# Patient Record
Sex: FEMALE | Race: WHITE | Employment: OTHER | URBAN - METROPOLITAN AREA
[De-identification: names, ages, dates, MRNs, and addresses within clinical notes are randomized per-mention and may not be internally consistent; named-entity substitution may affect disease eponyms.]

---

## 2017-03-13 ENCOUNTER — HOSPITAL ENCOUNTER (OUTPATIENT)
Age: 67
Discharge: HOME | End: 2017-03-13
Attending: PSYCHIATRY & NEUROLOGY
Payer: MEDICARE

## 2017-03-13 ENCOUNTER — HOSPITAL ENCOUNTER (EMERGENCY)
Age: 67
Discharge: STILL ADMITTED OR ADMINISTRATIVE | End: 2017-03-13
Attending: EMERGENCY MEDICINE
Payer: COMMERCIAL

## 2017-03-13 DIAGNOSIS — G35 MULTIPLE SCLEROSIS (HCC): ICD-10-CM

## 2017-03-13 LAB — CREAT UR-MCNC: 0.8 MG/DL (ref 0.6–1.3)

## 2017-03-13 PROCEDURE — 82565 ASSAY OF CREATININE: CPT

## 2017-03-13 PROCEDURE — 75810000275 HC EMERGENCY DEPT VISIT NO LEVEL OF CARE

## 2017-03-13 PROCEDURE — 74011250636 HC RX REV CODE- 250/636

## 2017-03-13 PROCEDURE — 70553 MRI BRAIN STEM W/O & W/DYE: CPT

## 2017-03-13 PROCEDURE — A9585 GADOBUTROL INJECTION: HCPCS

## 2017-03-13 RX ADMIN — GADOBUTROL 10 ML: 604.72 INJECTION INTRAVENOUS at 15:00

## 2017-08-02 ENCOUNTER — HOSPITAL ENCOUNTER (OUTPATIENT)
Dept: MAMMOGRAPHY | Age: 67
Discharge: HOME | End: 2017-08-02
Attending: NURSE PRACTITIONER
Payer: MEDICARE

## 2017-08-02 DIAGNOSIS — Z12.31 VISIT FOR SCREENING MAMMOGRAM: ICD-10-CM

## 2017-08-02 PROCEDURE — 77067 SCR MAMMO BI INCL CAD: CPT

## 2018-08-03 ENCOUNTER — HOSPITAL ENCOUNTER (OUTPATIENT)
Dept: MAMMOGRAPHY | Age: 68
Discharge: HOME | End: 2018-08-03
Attending: NURSE PRACTITIONER
Payer: MEDICARE

## 2018-08-03 DIAGNOSIS — Z12.31 VISIT FOR SCREENING MAMMOGRAM: ICD-10-CM

## 2018-08-03 PROCEDURE — 77063 BREAST TOMOSYNTHESIS BI: CPT

## 2019-08-06 ENCOUNTER — HOSPITAL ENCOUNTER (OUTPATIENT)
Dept: MAMMOGRAPHY | Age: 69
Discharge: HOME | End: 2019-08-06
Attending: FAMILY MEDICINE
Payer: MEDICARE

## 2019-08-06 DIAGNOSIS — Z12.31 VISIT FOR SCREENING MAMMOGRAM: ICD-10-CM

## 2019-08-06 PROCEDURE — 77063 BREAST TOMOSYNTHESIS BI: CPT

## 2020-09-29 ENCOUNTER — HOSPITAL ENCOUNTER (OUTPATIENT)
Dept: MAMMOGRAPHY | Age: 70
Discharge: HOME | End: 2020-09-29
Attending: INTERNAL MEDICINE
Payer: MEDICARE

## 2020-09-29 DIAGNOSIS — Z12.31 VISIT FOR SCREENING MAMMOGRAM: ICD-10-CM

## 2020-09-29 PROCEDURE — 77067 SCR MAMMO BI INCL CAD: CPT

## 2020-10-13 ENCOUNTER — HOSPITAL ENCOUNTER (OUTPATIENT)
Dept: NON INVASIVE DIAGNOSTICS | Age: 70
Discharge: HOME | End: 2020-10-13
Attending: INTERNAL MEDICINE
Payer: MEDICARE

## 2020-10-13 ENCOUNTER — TRANSCRIBE ORDER (OUTPATIENT)
Dept: SCHEDULING | Age: 70
End: 2020-10-13

## 2020-10-13 VITALS
HEIGHT: 60 IN | BODY MASS INDEX: 44.76 KG/M2 | WEIGHT: 228 LBS | SYSTOLIC BLOOD PRESSURE: 146 MMHG | DIASTOLIC BLOOD PRESSURE: 76 MMHG

## 2020-10-13 DIAGNOSIS — R06.09 OTHER FORM OF DYSPNEA: Primary | ICD-10-CM

## 2020-10-13 DIAGNOSIS — R06.09 OTHER FORM OF DYSPNEA: ICD-10-CM

## 2020-10-13 LAB
ECHO AO ROOT DIAM: 3.03 CM
ECHO AR MAX VEL PISA: 368.09 CM/S
ECHO AV PEAK GRADIENT: 54.2 MMHG
ECHO AV REGURGITANT PHT: 0.39 S
ECHO LA AREA 4C: 20.34 CM2
ECHO LA VOL 2C: 58.31 ML (ref 22–52)
ECHO LA VOL 4C: 56.94 ML (ref 22–52)
ECHO LA VOL BP: 62.24 ML (ref 22–52)
ECHO LA VOL/BSA BIPLANE: 31.54 ML/M2 (ref 16–28)
ECHO LA VOLUME INDEX A2C: 29.55 ML/M2 (ref 16–28)
ECHO LA VOLUME INDEX A4C: 28.85 ML/M2 (ref 16–28)
ECHO LV INTERNAL DIMENSION DIASTOLIC: 4.23 CM (ref 3.9–5.3)
ECHO LV INTERNAL DIMENSION SYSTOLIC: 2.83 CM
ECHO LV IVSD: 1.08 CM (ref 0.6–0.9)
ECHO LV MASS 2D: 132.1 G (ref 67–162)
ECHO LV MASS INDEX 2D: 66.9 G/M2 (ref 43–95)
ECHO LV POSTERIOR WALL DIASTOLIC: 0.85 CM (ref 0.6–0.9)
ECHO LVOT DIAM: 1.94 CM
ECHO LVOT PEAK GRADIENT: 6.54 MMHG
ECHO LVOT PEAK VELOCITY: 127.89 CM/S
ECHO LVOT SV: 88.1 ML
ECHO LVOT VTI: 29.81 CM
ECHO MV A VELOCITY: 118.97 CM/S
ECHO MV E DECELERATION TIME (DT): 0.24 S
ECHO MV E VELOCITY: 116.71 CM/S
ECHO MV E/A RATIO: 0.98
LVOT MG: 2.96 MMHG

## 2020-10-13 PROCEDURE — 93306 TTE W/DOPPLER COMPLETE: CPT

## 2020-10-19 ENCOUNTER — TRANSCRIBE ORDER (OUTPATIENT)
Dept: SCHEDULING | Age: 70
End: 2020-10-19

## 2020-10-19 DIAGNOSIS — I20.9 ANGINA PECTORIS (HCC): Primary | ICD-10-CM

## 2020-10-26 ENCOUNTER — HOSPITAL ENCOUNTER (OUTPATIENT)
Dept: NON INVASIVE DIAGNOSTICS | Age: 70
Discharge: HOME | End: 2020-10-26
Attending: INTERNAL MEDICINE
Payer: MEDICARE

## 2020-10-26 VITALS
BODY MASS INDEX: 44.76 KG/M2 | WEIGHT: 228 LBS | DIASTOLIC BLOOD PRESSURE: 77 MMHG | SYSTOLIC BLOOD PRESSURE: 161 MMHG | HEIGHT: 60 IN

## 2020-10-26 DIAGNOSIS — I20.9 ANGINA PECTORIS (HCC): ICD-10-CM

## 2020-10-26 LAB
STRESS BASELINE DIAS BP: 77 MMHG
STRESS BASELINE HR: 57 BPM
STRESS BASELINE SYS BP: 161 MMHG
STRESS ESTIMATED WORKLOAD: 1 METS
STRESS EXERCISE DUR MIN: NORMAL
STRESS PEAK DIAS BP: 77 MMHG
STRESS PEAK SYS BP: 161 MMHG
STRESS PERCENT HR ACHIEVED: 52 %
STRESS POST PEAK HR: 77 BPM
STRESS RATE PRESSURE PRODUCT: NORMAL BPM*MMHG
STRESS TARGET HR: 148 BPM

## 2020-10-26 PROCEDURE — 93017 CV STRESS TEST TRACING ONLY: CPT

## 2020-10-26 PROCEDURE — 74011250636 HC RX REV CODE- 250/636

## 2020-10-26 RX ORDER — SODIUM CHLORIDE 9 MG/ML
250 INJECTION, SOLUTION INTRAVENOUS ONCE
Status: COMPLETED | OUTPATIENT
Start: 2020-10-26 | End: 2020-10-26

## 2020-10-26 RX ADMIN — SODIUM CHLORIDE 250 ML: 900 INJECTION, SOLUTION INTRAVENOUS at 10:55

## 2020-10-26 RX ADMIN — REGADENOSON 0.4 MG: 0.08 INJECTION, SOLUTION INTRAVENOUS at 10:55

## 2020-10-26 NOTE — PROGRESS NOTES
Patient was given 10.4 milliCuries of 99mTc-Sestamibi for the resting images. Patient was also given 33.0 milliCuries of 99mTc-Sestamibi for the stress images. Injected with 0.4mg Lexiscan. Patient's armband was discarded and shredded.

## 2020-11-16 ENCOUNTER — TRANSCRIBE ORDER (OUTPATIENT)
Dept: SCHEDULING | Age: 70
End: 2020-11-16

## 2020-11-16 DIAGNOSIS — R06.02 SHORTNESS OF BREATH: Primary | ICD-10-CM

## 2020-11-23 ENCOUNTER — HOSPITAL ENCOUNTER (OUTPATIENT)
Dept: CT IMAGING | Age: 70
Discharge: HOME | End: 2020-11-23
Attending: INTERNAL MEDICINE
Payer: MEDICARE

## 2020-11-23 DIAGNOSIS — R06.02 SHORTNESS OF BREATH: ICD-10-CM

## 2020-11-23 LAB — CREAT UR-MCNC: 0.8 MG/DL (ref 0.6–1.3)

## 2020-11-23 PROCEDURE — 71260 CT THORAX DX C+: CPT

## 2020-11-23 PROCEDURE — 74011000636 HC RX REV CODE- 636

## 2020-11-23 PROCEDURE — 82565 ASSAY OF CREATININE: CPT

## 2020-11-23 RX ADMIN — IOPAMIDOL 100 ML: 612 INJECTION, SOLUTION INTRAVENOUS at 14:02

## 2021-02-10 ENCOUNTER — TRANSCRIBE ORDER (OUTPATIENT)
Dept: SCHEDULING | Age: 71
End: 2021-02-10

## 2021-02-10 DIAGNOSIS — K76.89 OTHER SPECIFIED DISEASES OF LIVER: Primary | ICD-10-CM

## 2021-03-08 ENCOUNTER — HOSPITAL ENCOUNTER (OUTPATIENT)
Dept: ULTRASOUND IMAGING | Age: 71
Discharge: HOME | End: 2021-03-08
Attending: INTERNAL MEDICINE
Payer: MEDICARE

## 2021-03-08 DIAGNOSIS — K76.89 OTHER SPECIFIED DISEASES OF LIVER: ICD-10-CM

## 2021-03-08 PROCEDURE — 76705 ECHO EXAM OF ABDOMEN: CPT

## 2021-05-17 ENCOUNTER — TRANSCRIBE ORDER (OUTPATIENT)
Dept: SCHEDULING | Age: 71
End: 2021-05-17

## 2021-05-17 DIAGNOSIS — G35 MULTIPLE SCLEROSIS (HCC): Primary | ICD-10-CM

## 2021-06-04 ENCOUNTER — HOSPITAL ENCOUNTER (OUTPATIENT)
Age: 71
Discharge: HOME | End: 2021-06-04
Attending: PSYCHIATRY & NEUROLOGY
Payer: MEDICARE

## 2021-06-04 DIAGNOSIS — G35 MULTIPLE SCLEROSIS (HCC): ICD-10-CM

## 2021-06-04 PROCEDURE — 82565 ASSAY OF CREATININE: CPT

## 2021-06-04 PROCEDURE — A9575 INJ GADOTERATE MEGLUMI 0.1ML: HCPCS

## 2021-06-04 PROCEDURE — 70553 MRI BRAIN STEM W/O & W/DYE: CPT

## 2021-06-04 PROCEDURE — 74011636320 HC RX REV CODE- 636/320

## 2021-06-04 RX ADMIN — GADOTERATE MEGLUMINE 20 ML: 376.9 INJECTION INTRAVENOUS at 17:07

## 2021-06-07 LAB — CREAT UR-MCNC: 1 MG/DL (ref 0.6–1.3)

## 2021-09-21 ENCOUNTER — TRANSCRIBE ORDER (OUTPATIENT)
Dept: SCHEDULING | Age: 71
End: 2021-09-21

## 2021-09-21 DIAGNOSIS — Z12.31 VISIT FOR SCREENING MAMMOGRAM: Primary | ICD-10-CM

## 2021-10-01 ENCOUNTER — HOSPITAL ENCOUNTER (OUTPATIENT)
Dept: MAMMOGRAPHY | Age: 71
Discharge: HOME | End: 2021-10-01
Attending: INTERNAL MEDICINE
Payer: MEDICARE

## 2021-10-01 DIAGNOSIS — Z12.31 VISIT FOR SCREENING MAMMOGRAM: ICD-10-CM

## 2021-10-01 PROCEDURE — 77063 BREAST TOMOSYNTHESIS BI: CPT

## 2022-06-27 ENCOUNTER — OFFICE VISIT (OUTPATIENT)
Dept: ORTHOPEDIC SURGERY | Age: 72
End: 2022-06-27
Payer: MEDICARE

## 2022-06-27 VITALS
OXYGEN SATURATION: 93 % | BODY MASS INDEX: 39.54 KG/M2 | HEART RATE: 89 BPM | HEIGHT: 60 IN | WEIGHT: 201.4 LBS | TEMPERATURE: 97.3 F

## 2022-06-27 DIAGNOSIS — G89.29 CHRONIC PAIN OF BOTH KNEES: Primary | ICD-10-CM

## 2022-06-27 DIAGNOSIS — M54.50 LUMBAR PAIN: ICD-10-CM

## 2022-06-27 DIAGNOSIS — M25.561 CHRONIC PAIN OF BOTH KNEES: Primary | ICD-10-CM

## 2022-06-27 DIAGNOSIS — M17.0 BILATERAL PRIMARY OSTEOARTHRITIS OF KNEE: ICD-10-CM

## 2022-06-27 DIAGNOSIS — M25.562 CHRONIC PAIN OF BOTH KNEES: Primary | ICD-10-CM

## 2022-06-27 PROCEDURE — 1123F ACP DISCUSS/DSCN MKR DOCD: CPT | Performed by: ORTHOPAEDIC SURGERY

## 2022-06-27 PROCEDURE — 73564 X-RAY EXAM KNEE 4 OR MORE: CPT | Performed by: ORTHOPAEDIC SURGERY

## 2022-06-27 PROCEDURE — 20610 DRAIN/INJ JOINT/BURSA W/O US: CPT | Performed by: ORTHOPAEDIC SURGERY

## 2022-06-27 PROCEDURE — 99204 OFFICE O/P NEW MOD 45 MIN: CPT | Performed by: ORTHOPAEDIC SURGERY

## 2022-06-27 RX ORDER — BETAMETHASONE SODIUM PHOSPHATE AND BETAMETHASONE ACETATE 3; 3 MG/ML; MG/ML
12 INJECTION, SUSPENSION INTRA-ARTICULAR; INTRALESIONAL; INTRAMUSCULAR; SOFT TISSUE ONCE
Status: COMPLETED | OUTPATIENT
Start: 2022-06-27 | End: 2022-06-27

## 2022-06-27 RX ADMIN — BETAMETHASONE SODIUM PHOSPHATE AND BETAMETHASONE ACETATE 12 MG: 3; 3 INJECTION, SUSPENSION INTRA-ARTICULAR; INTRALESIONAL; INTRAMUSCULAR; SOFT TISSUE at 11:09

## 2022-06-27 NOTE — PROGRESS NOTES
Patient: Lexus Cuevas                MRN: [id number]       SSN: [id number]  YOB: 1950        AGE: 66 y.o. SEX: female  Body mass index is 39.33 kg/m². PCP: Francois Reyna MD  06/27/22    Mrs. Azra Dominguez is a with bilateral knee pain previously a patient of Dr. Epi Garza left a little bit worse in the right and will wake her up from sleep at night worse with activities of daily living its been years since she is had any treatment at the to get a little bit of swelling knee examination today she is a very pleasant lady she likes has a good sense of humor hips rotate nicely left knee is in varus Wrightson valgus couple degree fixed flexion deformity bends fairly well to 110 degrees no cyanosis or clubbing minimal peripheral edema both feet warm and well-perfused Homans' sign is negative and there is mild evidence of neuropathy distally calf nontender mild joint line tenderness in all 3 compartments and just a slight effusion    X-rays performed today 6/27/2022 4 views both knees confirm relatively severe arthritis involving both knees left sides worse than the right there was a discussion regarding surgery was decided that is not currently recommended that we should pursue nonoperative measures therefore both knees injected she is going to is having some acupuncture for her back and would like to get another opinion about her back we will have her seen at the spine center been a pleasure to share in her care return to see us 3 months time sooner if there is any problem    REVIEW OF SYSTEMS:      CON: negative  EYE: negative   ENT: negative  RESP: negative  GI:    negative   :  negative  MSK: Positive  A twelve point review of systems was completed, positives noted and all other systems were reviewed and are negative          Past Medical History:   Diagnosis Date    Bilateral swelling of feet     GERD (gastroesophageal reflux disease)     H/O seasonal allergies     Hypercholesteremia     Ill-defined condition     Multiple sclerosis    Menopause     Age 42?  Migraine     w/ nausea    MS (multiple sclerosis) (HCC)     Shortness of breath     UNK etiology except obesity    Unspecified sleep apnea     uses C-Pap       Family History   Problem Relation Age of Onset    Ovarian Cancer Mother        Current Outpatient Medications   Medication Sig Dispense Refill    predniSONE (STERAPRED DS) 10 mg dose pack 6 day dose pack per package instructions 1 Package 0    ALPRAZolam (XANAX) 0.5 mg tablet Take 0.5 mg by mouth three (3) times daily as needed for Anxiety.  albuterol (PROAIR HFA) 90 mcg/actuation inhaler Take 2 puffs by inhalation every six (6) hours as needed for Wheezing.  fluticasone (FLONASE) 50 mcg/actuation nasal spray 2 sprays by Both Nostrils route nightly.  omeprazole (PRILOSEC) 20 mg capsule Take 20 mg by mouth daily.  hydrochlorothiazide (HYDRODIURIL) 25 mg tablet Take 25 mg by mouth daily.  aspirin delayed-release 81 mg tablet Take 81 mg by mouth daily.  simvastatin (ZOCOR) 20 mg tablet Take 20 mg by mouth nightly.  promethazine (PHENERGAN) 25 mg tablet Take 25 mg by mouth every six (6) hours as needed for Nausea.  tiZANidine (ZANAFLEX) 2 mg tablet Take 2 mg by mouth three (3) times daily as needed.  multivitamin (ONE A DAY) tablet Take 1 tablet by mouth daily.  VIT C/VIT E/LUTEIN/MIN/OMEGA-3 (OCUVITE PO) Take 1 tablet by mouth daily.  acetaminophen-caffeine (EXCEDRIN TENSION HEADACHE) 500-65 mg tab Take 2-3 tablets by mouth as needed.          No Known Allergies    Past Surgical History:   Procedure Laterality Date    HX CATARACT REMOVAL Left 1/2015    w/ lens implant    HX CERVICAL LAMINECTOMY      d/t pinched nerve    HX DILATION AND CURETTAGE      times 2    HX TONSILLECTOMY  age 8       Social History     Socioeconomic History    Marital status:      Spouse name: Not on file    Number of children: Not on file    Years of education: Not on file    Highest education level: Not on file   Occupational History    Not on file   Tobacco Use    Smoking status: Former Smoker     Packs/day: 3.00     Years: 25.00     Pack years: 75.00    Smokeless tobacco: Never Used    Tobacco comment: quit 22 years ago (age 38)   Substance and Sexual Activity    Alcohol use: No    Drug use: No    Sexual activity: Not on file   Other Topics Concern    Not on file   Social History Narrative    Not on file     Social Determinants of Health     Financial Resource Strain:     Difficulty of Paying Living Expenses: Not on file   Food Insecurity:     Worried About [employer] in the Last Year: Not on file    Ada of Food in the Last Year: Not on file   Transportation Needs:     Lack of Transportation (Medical): Not on file    Lack of Transportation (Non-Medical):  Not on file   Physical Activity:     Days of Exercise per Week: Not on file    Minutes of Exercise per Session: Not on file   Stress:     Feeling of Stress : Not on file   Social Connections:     Frequency of Communication with Friends and Family: Not on file    Frequency of Social Gatherings with Friends and Family: Not on file    Attends Christianity Services: Not on file    Active Member of [address] [place] [address] [employer] or Organizations: Not on file    Attends Club or Organization Meetings: Not on file    Marital Status: Not on file   Intimate Partner Violence:     Fear of Current or Ex-Partner: Not on file    Emotionally Abused: Not on file    Physically Abused: Not on file    Sexually Abused: Not on file   Housing Stability:     Unable to Pay for Housing in the Last Year: Not on file    Number of [place] in the Last Year: Not on file    Unstable Housing in the Last Year: Not on file       Visit Vitals  Pulse 89   Temp 97.3 °F (36.3 °C) (Temporal)   Ht 5' (1.524 m)   Wt 91.4 kg (201 lb 6.4 oz)   SpO2 93%   BMI 39.33 kg/m²         PHYSICAL EXAMINATION:  GENERAL: Alert and oriented x3, in no acute distress, well-developed, well-nourished, afebrile. HEART: No JVD. EYES: No scleral icterus   NECK: No significant lymphadenopathy   LUNGS: No respiratory compromise or indrawing  ABDOMEN: Soft, non-tender, non-distended. Note: This note was completed using voice recognition software. Any typographical/name errors or mistakes are unintentional.    Electronically signed by: MD SYBIL Serrato, Comfort Silvestre M.D., have reviewed the history, physical, and have updated the allergic reactions for [address] [place] [address]. TIME OUT performed immediately prior to the start of procedure:  Mariano Faulkner M.D., have performed the following reviews on [address] [place] [address] prior to the start of the procedure:    - Patient was identified by name and date of birth  - Agreement on procedure being performed was verified  - Risks and benefits explained to the patient  - Patient was positioned for comfort  - Consent was signed and verified  - Patient was advised regarding risks of bruising, bleeding, infection and pain    Time: 11:13 AM     Body Part: intra-articular injection of bilateral knees    Medication and Dose: Each injected with 1mL Celestone preparation, i.e. 6 mg, and 3 mL 1% lidocaine    Date of Procedure: 06/27/22    PROCEDURE PERFORMED BY : Leigh Silvestre M.D., [facility])    Provider Assisted by: Kaite Sage    Patient assisted by: self    Patient tolerated procedure well with no complications

## 2022-09-06 ENCOUNTER — TRANSCRIBE ORDER (OUTPATIENT)
Dept: SCHEDULING | Age: 72
End: 2022-09-06

## 2022-09-06 DIAGNOSIS — Z12.31 SCREENING MAMMOGRAM FOR HIGH-RISK PATIENT: Primary | ICD-10-CM

## 2022-09-22 ENCOUNTER — OFFICE VISIT (OUTPATIENT)
Dept: ORTHOPEDIC SURGERY | Age: 72
End: 2022-09-22
Payer: MEDICARE

## 2022-09-22 VITALS — WEIGHT: 200 LBS | BODY MASS INDEX: 39.06 KG/M2

## 2022-09-22 DIAGNOSIS — M17.0 PRIMARY OSTEOARTHRITIS OF BOTH KNEES: Primary | ICD-10-CM

## 2022-09-22 DIAGNOSIS — M54.50 LUMBAR PAIN ON PALPATION: ICD-10-CM

## 2022-09-22 PROCEDURE — 20610 DRAIN/INJ JOINT/BURSA W/O US: CPT | Performed by: ORTHOPAEDIC SURGERY

## 2022-09-22 RX ORDER — BETAMETHASONE SODIUM PHOSPHATE AND BETAMETHASONE ACETATE 3; 3 MG/ML; MG/ML
6 INJECTION, SUSPENSION INTRA-ARTICULAR; INTRALESIONAL; INTRAMUSCULAR; SOFT TISSUE ONCE
Status: COMPLETED | OUTPATIENT
Start: 2022-09-22 | End: 2022-09-22

## 2022-09-22 RX ADMIN — BETAMETHASONE SODIUM PHOSPHATE AND BETAMETHASONE ACETATE 6 MG: 3; 3 INJECTION, SUSPENSION INTRA-ARTICULAR; INTRALESIONAL; INTRAMUSCULAR; SOFT TISSUE at 10:31

## 2022-09-22 NOTE — PROGRESS NOTES
Patient: Kirt Cool                MRN: [id number]       SSN: [id number]  YOB: 1950        AGE: 74 y.o. SEX: female  Body mass index is 39.06 kg/m². PCP: Abelino Blanca MD  09/22/22    Mrs. Charisse Fisher returns for reevaluation of bilateral knee pain slightly worse on the right than on the left injections do help her she is not too keen on surgery she gets a solid 6 weeks of relief and then they tend to dissipate afterwards occasional night pain denies fevers or chills no recent hospital admissions denies any shortness of breath or chest pain in the examination at today nice lady who stands in varus mild lateral thrust both hips rotate well 2 degrees fixed flexion deformity she bends her knees fairly well 115 degrees her back is mildly tender there is no foot drop straight leg raise is negative EHL and tib ant 5 out of 5 to slight evidence of neuropathy not following a particular dermatomal innervation previous x-rays confirm end-stage arthritis involving both knees    Overall impression severe arthritis both knees still doing well with nonoperative measures she is not too keen on surgery there was a discussion on surgery it was decided its not currently recommended she still doing well with nonoperative measures having said that I be very happy to replace the knees when she would like has been a pleasure to share in her care and both her knees injected as per protocol thank you    [facility]  OFFICE PROCEDURE PROGRESS NOTE      Chart reviewed for the following:  Smiley DEVINE. Jackie Asher M.D., have reviewed the History, Physical and updated the Allergic reactions for Marita Petersen? TIME OUT performed immediately prior to start of procedure:  Anival DEVINE M.D., have performed the following reviews on Kirt Cool prior to the start of the procedure:  ????????  * Patient was identified by name and date of birth   * Patient advised regarding risks of bruising, bleeding, infection and pain  * Agreement on procedure being performed was verified  * Risks and Benefits explained to the patient  * Procedure site verified and marked as necessary  * Patient was positioned for comfort  * Consent was signed and verified    Time: 10:39 AM    Body part: Both knees intra-articular    Medication & Dose: 1mL Celestone preparation, i.e. 6 mg. ; 3mL 1% Lidocaine for each knee    Date of procedure: 09/22/22    Procedure performed by: Yamel Peralta. Concetta Weber M.D., [facility])    Provider assisted by: Tiffani Beavers    Patient assisted by: self    How tolerated by patient: tolerated the procedure well with no complications     REVIEW OF SYSTEMS:      CON: negative  EYE: negative   ENT: negative  RESP: negative  GI:    negative   :  negative  MSK: Positive  A twelve point review of systems was completed, positives noted and all other systems were reviewed and are negative          Past Medical History:   Diagnosis Date    Bilateral swelling of feet     GERD (gastroesophageal reflux disease)     H/O seasonal allergies     Hypercholesteremia     Ill-defined condition     Multiple sclerosis    Menopause     Age 42? Migraine     w/ nausea    MS (multiple sclerosis) (HCC)     Shortness of breath     UNK etiology except obesity    Unspecified sleep apnea     uses C-Pap       Family History   Problem Relation Age of Onset    Ovarian Cancer Mother        Current Outpatient Medications   Medication Sig Dispense Refill    predniSONE (STERAPRED DS) 10 mg dose pack 6 day dose pack per package instructions 1 Package 0    ALPRAZolam (XANAX) 0.5 mg tablet Take 0.5 mg by mouth three (3) times daily as needed for Anxiety. albuterol (PROVENTIL HFA, VENTOLIN HFA, PROAIR HFA) 90 mcg/actuation inhaler Take 2 puffs by inhalation every six (6) hours as needed for Wheezing. fluticasone (FLONASE) 50 mcg/actuation nasal spray 2 sprays by Both Nostrils route nightly.       omeprazole (PRILOSEC) 20 mg capsule Take 20 mg by mouth daily. hydrochlorothiazide (HYDRODIURIL) 25 mg tablet Take 25 mg by mouth daily. aspirin delayed-release 81 mg tablet Take 81 mg by mouth daily. simvastatin (ZOCOR) 20 mg tablet Take 20 mg by mouth nightly. promethazine (PHENERGAN) 25 mg tablet Take 25 mg by mouth every six (6) hours as needed for Nausea. tiZANidine (ZANAFLEX) 2 mg tablet Take 2 mg by mouth three (3) times daily as needed. multivitamin (ONE A DAY) tablet Take 1 tablet by mouth daily. VIT C/VIT E/LUTEIN/MIN/OMEGA-3 (OCUVITE PO) Take 1 tablet by mouth daily. acetaminophen-caffeine 500-65 mg (EXCEDRINE TENSION HEADACHE) 500-65 mg tab Take 2-3 tablets by mouth as needed.          No Known Allergies    Past Surgical History:   Procedure Laterality Date    HX CATARACT REMOVAL Left 1/2015    w/ lens implant    HX CERVICAL LAMINECTOMY      d/t pinched nerve    HX DILATION AND CURETTAGE      times 2    HX TONSILLECTOMY  age 8       Social History     Socioeconomic History    Marital status:      Spouse name: Not on file    Number of children: Not on file    Years of education: Not on file    Highest education level: Not on file   Occupational History    Not on file   Tobacco Use    Smoking status: Former     Packs/day: 3.00     Years: 25.00     Pack years: 75.00     Types: Cigarettes    Smokeless tobacco: Never    Tobacco comments:     quit 22 years ago (age 38)   Substance and Sexual Activity    Alcohol use: No    Drug use: No    Sexual activity: Not on file   Other Topics Concern    Not on file   Social History Narrative    Not on file     Social Determinants of Health     Financial Resource Strain: Not on file   Food Insecurity: Not on file   Transportation Needs: Not on file   Physical Activity: Not on file   Stress: Not on file   Social Connections: Not on file   Intimate Partner Violence: Not on file   Housing Stability: Not on file       Visit Vitals  Wt 90.7 kg (200 lb)   BMI 39.06 kg/m² PHYSICAL EXAMINATION:  GENERAL: Alert and oriented x3, in no acute distress, well-developed, well-nourished, afebrile. HEART: No JVD. EYES: No scleral icterus   NECK: No significant lymphadenopathy   LUNGS: No respiratory compromise or indrawing  ABDOMEN: Soft, non-tender, non-distended. Note: This note was completed using voice recognition software. Any typographical/name errors or mistakes are unintentional.    Electronically signed by:  Manasa Clay MD

## 2022-10-03 ENCOUNTER — HOSPITAL ENCOUNTER (OUTPATIENT)
Dept: MAMMOGRAPHY | Age: 72
Discharge: HOME | End: 2022-10-03
Attending: INTERNAL MEDICINE
Payer: MEDICARE

## 2022-10-03 DIAGNOSIS — Z12.31 SCREENING MAMMOGRAM FOR HIGH-RISK PATIENT: ICD-10-CM

## 2022-10-03 PROCEDURE — 77063 BREAST TOMOSYNTHESIS BI: CPT

## 2022-12-01 ENCOUNTER — OFFICE VISIT (OUTPATIENT)
Dept: ORTHOPEDIC SURGERY | Age: 72
End: 2022-12-01
Payer: MEDICARE

## 2022-12-01 VITALS — HEIGHT: 60 IN | WEIGHT: 201.4 LBS | BODY MASS INDEX: 39.54 KG/M2

## 2022-12-01 DIAGNOSIS — M25.562 CHRONIC PAIN OF BOTH KNEES: ICD-10-CM

## 2022-12-01 DIAGNOSIS — M17.0 PRIMARY OSTEOARTHRITIS OF BOTH KNEES: Primary | ICD-10-CM

## 2022-12-01 DIAGNOSIS — M25.561 CHRONIC PAIN OF BOTH KNEES: ICD-10-CM

## 2022-12-01 DIAGNOSIS — G89.29 CHRONIC PAIN OF BOTH KNEES: ICD-10-CM

## 2022-12-01 RX ORDER — CELECOXIB 200 MG/1
200 CAPSULE ORAL
Qty: 30 CAPSULE | Refills: 0 | Status: SHIPPED | OUTPATIENT
Start: 2022-12-01 | End: 2022-12-31

## 2022-12-01 RX ORDER — BETAMETHASONE SODIUM PHOSPHATE AND BETAMETHASONE ACETATE 3; 3 MG/ML; MG/ML
12 INJECTION, SUSPENSION INTRA-ARTICULAR; INTRALESIONAL; INTRAMUSCULAR; SOFT TISSUE ONCE
Status: COMPLETED | OUTPATIENT
Start: 2022-12-01 | End: 2022-12-01

## 2022-12-01 RX ADMIN — BETAMETHASONE SODIUM PHOSPHATE AND BETAMETHASONE ACETATE 12 MG: 3; 3 INJECTION, SUSPENSION INTRA-ARTICULAR; INTRALESIONAL; INTRAMUSCULAR; SOFT TISSUE at 11:04

## 2022-12-01 NOTE — PROGRESS NOTES
Patient: Emmanuel Ureña                MRN: [id number]       SSN: [id number]  YOB: 1950        AGE: 74 y.o. SEX: female  Body mass index is 39.33 kg/m². PCP: Cathleen Sweeney MD  12/01/22    Ms Issac Bose is here today for reevaluation of bilateral knee pain the previous injections only worked transiently but she is requesting them as well she would like to try with a arthritis medication we did discuss her aspirin as well and side effects we could try with a short course of Celebrex with usual precautions and the exam today body mass index is at 39 hips rotate adequately each knee has a mild angular deformity couple degree fixed flexion deformity bends fairly well no shortness of breath noted no cyanosis peripheral edema calf nontender Homans' sign is negative mild evidence of neuropathy no foot drop slight effusion and and -3 degrees extension bilaterally. Previous x-rays confirm severe relatively severe arthritis both knees    Both knees injected as per protocol there was a discussion regarding surgery we will see how things go we could consider it in the new year if she wants that there she    Return to follow-up 3 to 4 months for follow-up assessment thank you    REVIEW OF SYSTEMS:      CON: negative  EYE: negative   ENT: negative  RESP: negative  GI:    negative   :  negative  MSK: Positive  A twelve point review of systems was completed, positives noted and all other systems were reviewed and are negative          Past Medical History:   Diagnosis Date    Bilateral swelling of feet     GERD (gastroesophageal reflux disease)     H/O seasonal allergies     Hypercholesteremia     Ill-defined condition     Multiple sclerosis    Menopause     Age 42?     Migraine     w/ nausea    MS (multiple sclerosis) (HCC)     Shortness of breath     UNK etiology except obesity    Unspecified sleep apnea     uses C-Pap       Family History   Problem Relation Age of Onset    Ovarian Cancer Mother        Current Outpatient Medications   Medication Sig Dispense Refill    predniSONE (STERAPRED DS) 10 mg dose pack 6 day dose pack per package instructions 1 Package 0    ALPRAZolam (XANAX) 0.5 mg tablet Take 0.5 mg by mouth three (3) times daily as needed for Anxiety. albuterol (PROVENTIL HFA, VENTOLIN HFA, PROAIR HFA) 90 mcg/actuation inhaler Take 2 puffs by inhalation every six (6) hours as needed for Wheezing. fluticasone (FLONASE) 50 mcg/actuation nasal spray 2 sprays by Both Nostrils route nightly. omeprazole (PRILOSEC) 20 mg capsule Take 20 mg by mouth daily. hydrochlorothiazide (HYDRODIURIL) 25 mg tablet Take 25 mg by mouth daily. aspirin delayed-release 81 mg tablet Take 81 mg by mouth daily. simvastatin (ZOCOR) 20 mg tablet Take 20 mg by mouth nightly. promethazine (PHENERGAN) 25 mg tablet Take 25 mg by mouth every six (6) hours as needed for Nausea. tiZANidine (ZANAFLEX) 2 mg tablet Take 2 mg by mouth three (3) times daily as needed. multivitamin (ONE A DAY) tablet Take 1 tablet by mouth daily. VIT C/VIT E/LUTEIN/MIN/OMEGA-3 (OCUVITE PO) Take 1 tablet by mouth daily. acetaminophen-caffeine 500-65 mg (EXCEDRINE TENSION HEADACHE) 500-65 mg tab Take 2-3 tablets by mouth as needed.          No Known Allergies    Past Surgical History:   Procedure Laterality Date    HX CATARACT REMOVAL Left 1/2015    w/ lens implant    HX CERVICAL LAMINECTOMY      d/t pinched nerve    HX DILATION AND CURETTAGE      times 2    HX TONSILLECTOMY  age 8       Social History     Socioeconomic History    Marital status:      Spouse name: Not on file    Number of children: Not on file    Years of education: Not on file    Highest education level: Not on file   Occupational History    Not on file   Tobacco Use    Smoking status: Former     Packs/day: 3.00     Years: 25.00     Pack years: 75.00     Types: Cigarettes    Smokeless tobacco: Never    Tobacco comments: quit 22 years ago (age 38)   Substance and Sexual Activity    Alcohol use: No    Drug use: No    Sexual activity: Not on file   Other Topics Concern    Not on file   Social History Narrative    Not on file     Social Determinants of Health     Financial Resource Strain: Not on file   Food Insecurity: Not on file   Transportation Needs: Not on file   Physical Activity: Not on file   Stress: Not on file   Social Connections: Not on file   Intimate Partner Violence: Not on file   Housing Stability: Not on file       Visit Vitals  Ht 5' (1.524 m)   Wt 91.4 kg (201 lb 6.4 oz)   BMI 39.33 kg/m²         PHYSICAL EXAMINATION:  GENERAL: Alert and oriented x3, in no acute distress, well-developed, well-nourished, afebrile. HEART: No JVD. EYES: No scleral icterus   NECK: No significant lymphadenopathy   LUNGS: No respiratory compromise or indrawing  ABDOMEN: Soft, non-tender, non-distended. Note: This note was completed using voice recognition software. Any typographical/name errors or mistakes are unintentional.    Electronically signed by: MD SYBIL Huertas, Jayesh Fernandez M.D., have reviewed the history, physical, and have updated the allergic reactions for [address] [place] [address]. TIME OUT performed immediately prior to the start of procedure:  Aura Gonzalez M.D., have performed the following reviews on [address] [place] [address] prior to the start of the procedure:    - Patient was identified by name and date of birth  - Agreement on procedure being performed was verified  - Risks and benefits explained to the patient  - Patient was positioned for comfort  - Consent was signed and verified  - Patient was advised regarding risks of bruising, bleeding, infection and pain    Time: 11:00 AM     Body Part: intra-articular injection of bilateral  knees    Medication and Dose: 1mL Celestone preparation, i.e. 6 mg, and 3 mL 1% lidocaine, to each knee    Date of Procedure: 12/01/22    PROCEDURE PERFORMED BY : Shikha ROSE Leah Salamanca M.D., [facility])    Provider Assisted by: Karina Lim    Patient assisted by: self    Patient tolerated procedure well with no complications

## 2023-05-11 ENCOUNTER — HOSPITAL ENCOUNTER (OUTPATIENT)
Facility: HOSPITAL | Age: 73
Setting detail: RECURRING SERIES
Discharge: HOME | End: 2023-05-14
Payer: MEDICARE

## 2023-05-11 PROCEDURE — 97162 PT EVAL MOD COMPLEX 30 MIN: CPT

## 2023-05-16 ENCOUNTER — APPOINTMENT (OUTPATIENT)
Facility: HOSPITAL | Age: 73
End: 2023-05-16
Payer: MEDICARE

## 2023-05-16 ENCOUNTER — TELEPHONE (OUTPATIENT)
Facility: HOSPITAL | Age: 73
End: 2023-05-16

## 2023-05-16 NOTE — TELEPHONE ENCOUNTER
Pt. called to cx appt. - not able to get out of drive way. Pt. requested appt. on 5/18/23 at 4:00pm for AQUA be put on hold for her. Pt. said she wcb to confirm appt.

## 2023-05-18 ENCOUNTER — HOSPITAL ENCOUNTER (OUTPATIENT)
Facility: HOSPITAL | Age: 73
Setting detail: RECURRING SERIES
Discharge: HOME | End: 2023-05-21
Payer: MEDICARE

## 2023-05-18 PROCEDURE — 97113 AQUATIC THERAPY/EXERCISES: CPT

## 2023-05-23 ENCOUNTER — HOSPITAL ENCOUNTER (OUTPATIENT)
Facility: HOSPITAL | Age: 73
Setting detail: RECURRING SERIES
Discharge: HOME | End: 2023-05-26
Payer: MEDICARE

## 2023-05-23 PROCEDURE — 97113 AQUATIC THERAPY/EXERCISES: CPT

## 2023-06-01 ENCOUNTER — HOSPITAL ENCOUNTER (OUTPATIENT)
Facility: HOSPITAL | Age: 73
Setting detail: RECURRING SERIES
Discharge: HOME | End: 2023-06-04
Payer: MEDICARE

## 2023-06-01 PROCEDURE — 97113 AQUATIC THERAPY/EXERCISES: CPT

## 2023-06-15 ENCOUNTER — HOSPITAL ENCOUNTER (OUTPATIENT)
Facility: HOSPITAL | Age: 73
Setting detail: RECURRING SERIES
Discharge: HOME | End: 2023-06-18
Payer: MEDICARE

## 2023-06-15 PROCEDURE — 97113 AQUATIC THERAPY/EXERCISES: CPT

## 2023-06-22 ENCOUNTER — HOSPITAL ENCOUNTER (OUTPATIENT)
Facility: HOSPITAL | Age: 73
Setting detail: RECURRING SERIES
Discharge: HOME | End: 2023-06-25
Payer: MEDICARE

## 2023-06-22 PROCEDURE — 97112 NEUROMUSCULAR REEDUCATION: CPT

## 2023-06-29 ENCOUNTER — APPOINTMENT (OUTPATIENT)
Facility: HOSPITAL | Age: 73
End: 2023-06-29
Payer: MEDICARE

## 2023-11-14 ENCOUNTER — TRANSCRIBE ORDERS (OUTPATIENT)
Facility: HOSPITAL | Age: 73
End: 2023-11-14

## 2023-11-14 DIAGNOSIS — Z12.31 VISIT FOR SCREENING MAMMOGRAM: Primary | ICD-10-CM

## 2023-12-21 ENCOUNTER — HOSPITAL ENCOUNTER (OUTPATIENT)
Facility: HOSPITAL | Age: 73
Discharge: HOME | End: 2023-12-24
Payer: MEDICARE

## 2023-12-21 VITALS — HEIGHT: 60 IN | BODY MASS INDEX: 39.46 KG/M2 | WEIGHT: 201 LBS

## 2023-12-21 DIAGNOSIS — Z12.31 VISIT FOR SCREENING MAMMOGRAM: ICD-10-CM

## 2023-12-21 PROCEDURE — 77063 BREAST TOMOSYNTHESIS BI: CPT

## 2024-12-31 ENCOUNTER — HOSPITAL ENCOUNTER (OUTPATIENT)
Facility: HOSPITAL | Age: 74
Discharge: HOME | End: 2025-01-03
Payer: MEDICARE

## 2024-12-31 VITALS — BODY MASS INDEX: 33.79 KG/M2 | WEIGHT: 179 LBS | HEIGHT: 61 IN

## 2024-12-31 DIAGNOSIS — Z12.31 VISIT FOR SCREENING MAMMOGRAM: ICD-10-CM

## 2024-12-31 PROCEDURE — 77063 BREAST TOMOSYNTHESIS BI: CPT

## 2025-05-16 ENCOUNTER — HOSPITAL ENCOUNTER (OUTPATIENT)
Facility: HOSPITAL | Age: 75
Setting detail: RECURRING SERIES
Discharge: HOME | End: 2025-05-19
Payer: MEDICARE

## 2025-05-16 PROCEDURE — 97162 PT EVAL MOD COMPLEX 30 MIN: CPT

## 2025-05-16 PROCEDURE — 97110 THERAPEUTIC EXERCISES: CPT

## 2025-05-16 NOTE — PROGRESS NOTES
[facility]  [address] [place] [address] Ph:[phone] Fx: [phone]  Plan of Care / Statement of Necessity for Physical Therapy Services     Patient Name: Kelsy Larios : 1950   Medical   Diagnosis: Radiculopathy, lumbar region [M54.16] Treatment Diagnosis: M54.59  OTHER LOWER BACK PAIN      Onset Date: `2 yrs  Payor :  Payor: [facility] MEDICARE / [facility] / Product Type: *No Product type* /    Referral Source: Dakota Tan MD Start of Care (SOC): 2025   Prior Hospitalization: See medical history Provider #: [id number]   Prior Level of Function: Independent, like to go camping, swim in her pool.    Comorbidities:  MS, HBP, Diabetes, COPD     Assessment / key information:  pt is a 74 yr old female with cc of LS pain and that became worst after she had a fall backwards approx 2 yrs ago. Pt reports pain at 10/10 and is intermittent. Pain radiates into both glutes. SLR Test is negative bilaterally. Pt presents with TTP to superior glutes, L3-L5, tightness to piriformis and hip flexors, weakness to Hip abductors, IR and core and glutes.   Sit to stand x 6 in 30 sec. Pt reports difficulty with standing, ambulating for 10 or more mins. She ambulates with a Rolator Walker . Pt has MS and has poor balance strategies. Left hip , quad and HS 4/5, right hip ,quad, Hs=4+/5  Romberg EO/EC x 30 sec with difficulty with EC due to LOB.   Pt will benefit from skilled PT to improve strength, balance, decrease pain, improve functional activity and ambulation function to improve QOL.     Evaluation Complexity:  History:  MEDIUM  Complexity : 1-2 comorbidities / personal factors will impact the outcome/ POC ; Examination:  MEDIUM Complexity : 3 Standardized tests and measures addressin body structure, function, activity limitation and / or participation in recreation  ;Presentation:  MEDIUM Complexity : Evolving 
LOB    Patient will continue to benefit from skilled PT / OT services to modify and progress therapeutic interventions, analyze and address functional mobility deficits, analyze and address ROM deficits, analyze and address strength deficits, analyze and address soft tissue restrictions, analyze and cue for proper movement patterns, analyze and modify for postural abnormalities, analyze and address imbalance/dizziness, and instruct in home and community integration to address functional deficits and attain remaining goals.    Progress toward goals / Updated goals:  []  See Progress Note/Recertification    See poc      PLAN  - Upgrade activities as tolerated    Cinda Moe PT    5/16/2025    12:30 PM  If an interpreting service was utilized for treatment of this patient, the contents of this document represent the material reviewed with the patient via the .     Future Appointments   Date Time Provider Department Center   5/16/2025  1:20 PM Cinda Moe, [facility]

## 2025-06-02 ENCOUNTER — HOSPITAL ENCOUNTER (OUTPATIENT)
Facility: HOSPITAL | Age: 75
Setting detail: RECURRING SERIES
Discharge: HOME | End: 2025-06-05
Payer: MEDICARE

## 2025-06-02 PROCEDURE — 97110 THERAPEUTIC EXERCISES: CPT

## 2025-06-02 PROCEDURE — 97530 THERAPEUTIC ACTIVITIES: CPT

## 2025-06-02 PROCEDURE — 97112 NEUROMUSCULAR REEDUCATION: CPT

## 2025-06-02 NOTE — PROGRESS NOTES
goals.    Progress toward goals / Updated goals:  [x]  See Progress Note/Recertification    Short Term Goals: To be accomplished in 3 weeks   Goal: Patient will demonstrate compliance with HEP in order to improve LS mobility    Status at evaluation: HEP issued? at Eval.   Goal met. Reports compliance 6/2/25  ?   Long Term Goals: To be accomplished in 24 treatments  Goal: Patient will improve MYLENE by 10 % in order to demonstrate a significant improvement in pain for increased ease of daily activities.    Status at evaluation/last progress note: MYLENE = 76%      Goal: Patient will report a 60 % improvement in pain symptoms at work in order to improve quality of life.    Status at evaluation: 0%      Goal: Pt will report worst pain </= 5 /10 for increased ease waking in the morning.    Status at evaluation: 10 /10         Goal:  pt will perform sit to stand x10 in 30 sec to ease with safety during Transfers  Status at evaluation: 6x/30 sec    Next PN/ RC due 6/14/25, cert 8/14/25  Auth due (visit number/ date) 5v, 5/16/25-8/29/25    PLAN  - Continue Plan of Care  - Upgrade activities as tolerated    Elida Eugene PTA    6/2/2025    6:45 AM  If an interpreting service was utilized for treatment of this patient, the contents of this document represent the material reviewed with the patient via the .     Future Appointments   Date Time Provider Department Center   6/2/2025  1:20 PM Elida Eugene PTA [id number]

## 2025-06-10 ENCOUNTER — APPOINTMENT (OUTPATIENT)
Facility: HOSPITAL | Age: 75
End: 2025-06-10
Payer: MEDICARE

## 2025-06-16 ENCOUNTER — HOSPITAL ENCOUNTER (OUTPATIENT)
Facility: HOSPITAL | Age: 75
Setting detail: RECURRING SERIES
Discharge: HOME | End: 2025-06-19
Payer: MEDICARE

## 2025-06-16 PROCEDURE — 97110 THERAPEUTIC EXERCISES: CPT

## 2025-06-16 PROCEDURE — 97112 NEUROMUSCULAR REEDUCATION: CPT

## 2025-06-16 PROCEDURE — 97530 THERAPEUTIC ACTIVITIES: CPT

## 2025-06-16 NOTE — THERAPY RECERTIFICATION
[facility]  [address] [place] [address] - Ph: [phone]   Fx: [phone]  PHYSICAL THERAPY PROGRESS NOTE  [x] Progress Note  [] Discharge Summary    Patient Name: Kelsy Larios : 1950   Treatment/Medical Diagnosis: Radiculopathy, lumbar region   Referral Source: Dakota Tan MD     Date of Initial Visit: 25 Attended Visits: 3 Missed Visits: 1       Prior Level of Function: Independent, like to go camping, swim in her pool.    Comorbidities:  MS, HBP, Diabetes, COPD       SUMMARY OF TREATMENT  Patient is making progress toward goals in therapy. Patient has attended 2 therapy sessions since evaluation due to awaiting insurance authorization. Patient reports compliance with HEP. OSW score increased 20% which indicates a good increase in functional ability. Patient continues to perform all ambulation with rollator. Patient continues to report 10/10 pain at worst, but has decreased to 7/10 during therapy session. Patient was able to perform 9 sit to stands in 30 seconds indicating increased strength in glutes and B LE. Patient reports 10% improvement in overall symptoms since beginning therapy. Patient cont to report having difficulty with performing dressing and bathing. Patient continues to be unable to perform heavy lifting from the floor. Patient continues with decreased standing and walking tolerance, but is slowly improving. Patient continues to report having difficulty with sleeping. Patient will continue to benefit from skilled PT / OT services to modify and progress therapeutic interventions, analyze and address functional mobility deficits, analyze and address ROM deficits, analyze and address strength deficits, analyze and cue for proper movement patterns, analyze and modify for postural abnormalities, analyze and address imbalance/dizziness, and instruct in home and community integration to address functional

## 2025-06-16 NOTE — PROGRESS NOTES
PHYSICAL / OCCUPATIONAL THERAPY - DAILY TREATMENT NOTE    Patient Name: Kelsy Larios    Date: 2025    : 1950  Insurance: Payor: [facility] MEDICARE / Plan: [facility] [employer] PLUS / Product Type: *No Product type* /      Patient  verified Yes     Visit #   Current / Total 3 24   Time   In / Out 1157 1245   Pain   In / Out 7/10 0/10   Subjective Functional Status/Changes: Pt stated that she had a lot of pain this morning, but is a little better today     TREATMENT AREA =  Radiculopathy, lumbar region    OBJECTIVE    Modalities Rationale:     decrease pain and increase tissue extensibility to improve patient's ability to progress to PLOF and address remaining functional goals.     min [] Estim Unattended, type/location:                                      []  w/ice    []  w/heat    min [] Estim Attended, type/location:                                     []  w/US     []  w/ice    []  w/heat    []  TENS insruct      min []  Mechanical Traction: type/lbs                   []  pro   []  sup   []  int   []  cont    []  before manual    []  after manual    min []  Ultrasound, settings/location:     10 min  unbill []  Ice     [x]  Heat    location/position: Seated  Low back    min []  Paraffin,  details:     min []  Vasopneumatic Device, press/temp:     min []  Whirlpool / Fluido:    If using vaso (only need to measure limb vaso being performed on)      pre-treatment girth :       post-treatment girth :       measured at (landmark location) :      min []  Other:    Skin assessment post-treatment:   Intact      Therapeutic Procedures:    Tx Min Billable or 1:1 Min (if diff from Tx Min) Procedure, Rationale, Specifics   [id number] Therapeutic Exercise (timed):  increase ROM, strength, coordination, balance, and proprioception to improve patient's ability to progress to PLOF and address remaining functional goals. (see flow sheet as applicable)     Details if applicable:       [phone] [id number]

## 2025-06-23 ENCOUNTER — HOSPITAL ENCOUNTER (OUTPATIENT)
Facility: HOSPITAL | Age: 75
Setting detail: RECURRING SERIES
Discharge: HOME | End: 2025-06-26
Payer: MEDICARE

## 2025-06-23 PROCEDURE — 97112 NEUROMUSCULAR REEDUCATION: CPT

## 2025-06-23 PROCEDURE — 97530 THERAPEUTIC ACTIVITIES: CPT

## 2025-06-23 PROCEDURE — 97110 THERAPEUTIC EXERCISES: CPT

## 2025-06-30 ENCOUNTER — APPOINTMENT (OUTPATIENT)
Facility: HOSPITAL | Age: 75
End: 2025-06-30
Payer: MEDICARE

## 2025-07-09 ENCOUNTER — HOSPITAL ENCOUNTER (OUTPATIENT)
Facility: HOSPITAL | Age: 75
Setting detail: RECURRING SERIES
Discharge: HOME | End: 2025-07-12
Payer: MEDICARE

## 2025-07-09 PROCEDURE — 97112 NEUROMUSCULAR REEDUCATION: CPT

## 2025-07-09 PROCEDURE — 97530 THERAPEUTIC ACTIVITIES: CPT

## 2025-07-09 PROCEDURE — 97110 THERAPEUTIC EXERCISES: CPT

## 2025-07-09 NOTE — PROGRESS NOTES
min = 5 units   Total Total     Charge Capture    [x]  Patient Education billed concurrently with other procedures   [x] Review HEP    [] Progressed/Changed HEP, detail:    [] Other detail:       Objective Information/Functional Measures/Assessment  -MYLENE=26%  -wants to sit or lay down to do ex's.   - the heat is making her fatigued. Pt request to continue with HEP and DC today. The heat is not conducive to her MS.     Pt Dc'd to HEP.     Patient will continue to benefit from skilled PT / OT services to modify and progress therapeutic interventions, analyze and address functional mobility deficits, analyze and address ROM deficits, analyze and address strength deficits, analyze and address soft tissue restrictions, analyze and cue for proper movement patterns, analyze and modify for postural abnormalities, analyze and address imbalance/dizziness, and instruct in home and community integration to address functional deficits and attain remaining goals.    Progress toward goals / Updated goals:  []  See Progress Note/Recertification    Goal: Patient will demonstrate compliance with HEP in order to improve LS mobility    Status at evaluation: HEP issued? at Eval.   Goal met. Reports compliance 6/2/25  ?   Long Term Goals: To be accomplished in 24 treatments  Goal: Patient will improve MYLENE by 10 % in order to demonstrate a significant improvement in pain for increased ease of daily activities.    Status at evaluation/last progress note: MYLENE = 76%   Goal met. Decreased to 56%     Goal: Patient will report a 60 % improvement in pain symptoms at work in order to improve quality of life.    Status at evaluation: 0%   Slight progression. Reports 10% improvement      Goal: Pt will report worst pain </= 5 /10 for increased ease waking in the morning.    Status at evaluation: 10 /10   Not met. Cont to report 10/10 at worst      Goal:  pt will perform sit to stand x10 in 30 sec to ease with safety during Transfers  Status at